# Patient Record
Sex: FEMALE | Race: WHITE | Employment: OTHER
[De-identification: names, ages, dates, MRNs, and addresses within clinical notes are randomized per-mention and may not be internally consistent; named-entity substitution may affect disease eponyms.]

---

## 2024-06-18 ENCOUNTER — CARE COORDINATION (OUTPATIENT)
Dept: OTHER | Facility: CLINIC | Age: 84
End: 2024-06-18

## 2024-06-18 NOTE — CARE COORDINATION
Ambulatory Care Coordination Note     6/18/2024 12:30 PM     patient outreach attempt by this ACM today to perform care management follow up . ACM was unable to reach the patient by telephone today; phone number has been disconnected.      ACM: Harper Dunbar RN     Care Summary Note: ACM unable to reach patient due to only contact number for patient is disconnected.    Harper Dunbar [phone]